# Patient Record
Sex: MALE | Race: BLACK OR AFRICAN AMERICAN | NOT HISPANIC OR LATINO | Employment: FULL TIME | URBAN - METROPOLITAN AREA
[De-identification: names, ages, dates, MRNs, and addresses within clinical notes are randomized per-mention and may not be internally consistent; named-entity substitution may affect disease eponyms.]

---

## 2020-03-04 ENCOUNTER — HOSPITAL ENCOUNTER (EMERGENCY)
Facility: OTHER | Age: 25
Discharge: HOME | End: 2020-03-04
Attending: EMERGENCY MEDICINE
Payer: MEDICAID

## 2020-03-04 VITALS
HEART RATE: 68 BPM | HEIGHT: 73 IN | RESPIRATION RATE: 16 BRPM | DIASTOLIC BLOOD PRESSURE: 58 MMHG | SYSTOLIC BLOOD PRESSURE: 103 MMHG | OXYGEN SATURATION: 96 % | WEIGHT: 180 LBS | BODY MASS INDEX: 23.86 KG/M2 | TEMPERATURE: 99 F

## 2020-03-04 DIAGNOSIS — J06.9 URI WITH COUGH AND CONGESTION: Primary | ICD-10-CM

## 2020-03-04 DIAGNOSIS — R05.9 COUGH: ICD-10-CM

## 2020-03-04 PROCEDURE — 99283 EMERGENCY DEPT VISIT LOW MDM: CPT | Mod: 25

## 2020-03-04 RX ORDER — MULTIVITAMIN
1 TABLET ORAL DAILY
COMMUNITY

## 2020-03-04 NOTE — ED TRIAGE NOTES
Pt presents to ed c/o chest pain that worsens with cough. The pt denies taking anything for the s/s, stating the chest pain is intermittent, denying any current chest pain . He denies any n/v/d fever or chills. Pt is awake and alert, resp pattern even and non labored.

## 2020-03-04 NOTE — ED PROVIDER NOTES
Encounter Date: 3/4/2020    SCRIBE #1 NOTE: Mason CLOUD, am scribing for, and in the presence of, Dr. Holcomb.       History     Chief Complaint   Patient presents with    URI     since last week with cough and congestion, has Hx of colapsed lung and states it felt like this lasttime     Time seen by provider: 1:53 AM    This is a 24 y.o. male who presents with complaint of an unproductive cough for the last 6 days. Pt states he has a hx of a collapsed lung and the current symptoms feel similar to previous. He reports congestion and chest pain. He denies fever.    The history is provided by the patient and medical records.     Review of patient's allergies indicates:  No Known Allergies  History reviewed. No pertinent past medical history.  History reviewed. No pertinent surgical history.  History reviewed. No pertinent family history.  Social History     Tobacco Use    Smoking status: Current Every Day Smoker   Substance Use Topics    Alcohol use: Yes     Comment: occasionally    Drug use: Yes     Types: Marijuana     Review of Systems   Constitutional: Negative for chills and fever.   HENT: Positive for congestion. Negative for rhinorrhea and sore throat.    Eyes: Negative for visual disturbance.   Respiratory: Positive for cough. Negative for shortness of breath.    Cardiovascular: Positive for chest pain.   Gastrointestinal: Negative for abdominal pain, diarrhea, nausea and vomiting.   Genitourinary: Negative for dysuria.   Musculoskeletal: Negative for back pain.   Skin: Negative for rash.   Neurological: Negative for dizziness, weakness and light-headedness.   Psychiatric/Behavioral: Negative for confusion.       Physical Exam     Initial Vitals [03/04/20 0137]   BP Pulse Resp Temp SpO2   131/89 89 18 99 °F (37.2 °C) 100 %      MAP       --         Physical Exam    Nursing note and vitals reviewed.  Constitutional: He appears well-developed and well-nourished. He is not diaphoretic. No distress.    HENT:   Head: Normocephalic and atraumatic.   Eyes: Conjunctivae and EOM are normal. Pupils are equal, round, and reactive to light. No scleral icterus.   Neck: Normal range of motion. Neck supple.   Cardiovascular: Normal rate, regular rhythm and normal heart sounds. Exam reveals no gallop and no friction rub.    No murmur heard.  Pulmonary/Chest: Breath sounds normal. No respiratory distress. He has no wheezes. He has no rhonchi. He has no rales.   Abdominal: Soft. Bowel sounds are normal. He exhibits no distension. There is no tenderness. There is no rebound and no guarding.   Musculoskeletal: Normal range of motion. He exhibits no edema or tenderness.   Neurological: He is alert and oriented to person, place, and time.   Skin: Skin is warm and dry.         ED Course   Procedures  Labs Reviewed - No data to display       Imaging Results          X-Ray Chest PA And Lateral (Final result)  Result time 03/04/20 02:18:33    Final result by Rodriguez Duncan MD (03/04/20 02:18:33)                 Impression:      No convincing radiographic evidence of acute intrathoracic process.      Electronically signed by: Rodriguez Duncan MD  Date:    03/04/2020  Time:    02:18             Narrative:    EXAMINATION:  XR CHEST PA AND LATERAL    CLINICAL HISTORY:  Cough    TECHNIQUE:  PA and lateral views of the chest were performed.    COMPARISON:  None    FINDINGS:  Cardiac monitoring leads overlie the chest.  The cardiomediastinal silhouette appears within normal limits.  The lungs are symmetrically aerated without evidence of focal airspace consolidation.  There is no evidence of large pleural effusion or pneumothorax.  Visualized osseous structures appear intact.                              X-Rays:   Independently Interpreted Readings:   Chest X-Ray: Normal heart size.  No infiltrates.  No acute abnormalities.     Medical Decision Making:   History:   Old Medical Records: I decided to obtain old medical records.  Initial  Assessment:   24-year-old male with history of a right-sided pneumothorax presents complaining cough for the past week with associated right-sided chest pain coughing and movement.  Patient states it feels similar to when he was diagnosed with a pneumothorax.  Patient is in no respiratory distress on exam with normal vital signs.  Independently Interpreted Test(s):   I have ordered and independently interpreted X-rays - see prior notes.  Clinical Tests:   Radiological Study: Reviewed and Ordered  ED Management:  X-ray obtained and negative for any acute process.  Upon reassessment the patient was sleeping comfortably in no distress.  Patient reassured by the negative chest x-ray.  He was given precautions for seeking immediate re-evaluation.            Scribe Attestation:   Scribe #1: I performed the above scribed service and the documentation accurately describes the services I performed. I attest to the accuracy of the note.    Attending Attestation:           Physician Attestation for Scribe:  Physician Attestation Statement for Scribe #1: I, Dr. Holcomb, reviewed documentation, as scribed by Mason Marroquin in my presence, and it is both accurate and complete.                               Clinical Impression:     1. URI with cough and congestion    2. Cough                ED Disposition Condition    Discharge Stable        ED Prescriptions     None        Follow-up Information     Follow up With Specialties Details Why Contact Info    Your primary care doctor                                         Ellie Holcomb MD  03/04/20 [id number]

## 2020-10-29 ENCOUNTER — HOSPITAL ENCOUNTER (EMERGENCY)
Facility: OTHER | Age: 25
Discharge: HOME | End: 2020-10-29
Attending: EMERGENCY MEDICINE
Payer: MEDICAID

## 2020-10-29 VITALS
OXYGEN SATURATION: 100 % | SYSTOLIC BLOOD PRESSURE: 98 MMHG | HEART RATE: 61 BPM | DIASTOLIC BLOOD PRESSURE: 59 MMHG | WEIGHT: 180 LBS | HEIGHT: 72 IN | TEMPERATURE: 98 F | BODY MASS INDEX: 24.38 KG/M2 | RESPIRATION RATE: 18 BRPM

## 2020-10-29 DIAGNOSIS — M62.830 MUSCLE SPASM OF BACK: Primary | ICD-10-CM

## 2020-10-29 PROCEDURE — 99284 EMERGENCY DEPT VISIT MOD MDM: CPT | Mod: 25

## 2020-10-29 PROCEDURE — 96372 THER/PROPH/DIAG INJ SC/IM: CPT

## 2020-10-29 PROCEDURE — 25000003 PHARM REV CODE 250: Performed by: EMERGENCY MEDICINE

## 2020-10-29 PROCEDURE — 63600175 PHARM REV CODE 636 W HCPCS: Performed by: EMERGENCY MEDICINE

## 2020-10-29 RX ORDER — METHOCARBAMOL 500 MG/1
1000 TABLET, FILM COATED ORAL 3 TIMES DAILY
Qty: 30 TABLET | Refills: 0 | Status: SHIPPED | OUTPATIENT
Start: 2020-10-29 | End: 2020-11-03

## 2020-10-29 RX ORDER — INDOMETHACIN 50 MG/1
50 CAPSULE ORAL
Qty: 15 CAPSULE | Refills: 0 | Status: SHIPPED | OUTPATIENT
Start: 2020-10-29 | End: 2020-11-01

## 2020-10-29 RX ORDER — DIAZEPAM 2 MG/1
2 TABLET ORAL
Status: COMPLETED | OUTPATIENT
Start: 2020-10-29 | End: 2020-10-29

## 2020-10-29 RX ORDER — KETOROLAC TROMETHAMINE 30 MG/ML
30 INJECTION, SOLUTION INTRAMUSCULAR; INTRAVENOUS
Status: COMPLETED | OUTPATIENT
Start: 2020-10-29 | End: 2020-10-29

## 2020-10-29 RX ADMIN — DIAZEPAM 2 MG: 2 TABLET ORAL at 04:10

## 2020-10-29 RX ADMIN — KETOROLAC TROMETHAMINE 30 MG: 30 INJECTION, SOLUTION INTRAMUSCULAR; INTRAVENOUS at 04:10

## 2020-10-29 NOTE — ED NOTES
Pt arrives to Ed with c/o back pain after fall at work. PT denies LOC or trauma to head. Pt reports taking muscle relaxer's and ibuprofen with minimal relief of symptoms. Lidocaine patch noted to upper back. Pt report minimal relief with symptoms after application of lidocaine patch. Pt placed on bp cycling and pulse ox.

## 2020-10-29 NOTE — ED PROVIDER NOTES
Encounter Date: 10/29/2020       History     Chief Complaint   Patient presents with    Back Pain     Pt reports fallign at work, denies LOC, pain unrelieved by medication     25-year-old male presents complaining of bilateral upper back/neck pain x2 days.  In addition he states he fell from standing after a slip and fall earlier in the evening.  He denies any head injury or loss of consciousness.  He denies any numbness or weakness.  Interventions at home included 1 dose of Flexeril as well as a medication patch.  Pain aggravated with neck movement.        Review of patient's allergies indicates:  No Known Allergies  No past medical history on file.  No past surgical history on file.  No family history on file.  Social History     Tobacco Use    Smoking status: Current Every Day Smoker   Substance Use Topics    Alcohol use: Yes     Comment: occasionally    Drug use: Yes     Types: Marijuana     Review of Systems   Constitutional: Negative for chills, diaphoresis and fever.   Respiratory: Negative for cough, shortness of breath, wheezing and stridor.    Cardiovascular: Negative for chest pain.   Gastrointestinal: Negative for abdominal pain, diarrhea, nausea and vomiting.   Musculoskeletal: Positive for back pain and neck pain. Negative for myalgias and neck stiffness.   Neurological: Negative for dizziness, facial asymmetry, weakness and light-headedness.       Physical Exam     Initial Vitals [10/29/20 0300]   BP Pulse Resp Temp SpO2   131/86 79 18 98.1 °F (36.7 °C) 100 %      MAP       --         Physical Exam    Vitals reviewed.  Constitutional: He appears well-developed and well-nourished.   HENT:   Head: Normocephalic and atraumatic.   Right Ear: External ear normal.   Left Ear: External ear normal.   Eyes: Conjunctivae and EOM are normal. Right eye exhibits no discharge. Left eye exhibits no discharge.   Cardiovascular: Normal rate, regular rhythm and normal heart sounds.   Pulmonary/Chest: Breath sounds  normal. No respiratory distress. He has no wheezes. He has no rhonchi. He has no rales.   Musculoskeletal: Normal range of motion.      Comments: Tenderness to palpation over the right trapezius and sternocleidomastoid muscles   Neurological: He is alert and oriented to person, place, and time. He has normal strength. GCS score is 15. GCS eye subscore is 4. GCS verbal subscore is 5. GCS motor subscore is 6.         ED Course   Procedures  Labs Reviewed - No data to display       Imaging Results    None          Medical Decision Making:   Initial Assessment:   25-year-old male presents complaining of bilateral upper neck/back pain.  On exam he has tenderness to palpation along the right sternocleidomastoid and trapezius as well as palpable spasm of the trapezius muscle.  ED Management:  Patient treated with Valium and Toradol in the emergency department discharged home with a prescription for Robaxin and indomethacin.  Was counseled supportive care for home and given indications for seeking re-evaluation emergency department.                             Clinical Impression:       ICD-10-CM ICD-9-CM   1. Muscle spasm of back  M62.830 724.8                          ED Disposition Condition    Discharge Stable        ED Prescriptions     Medication Sig Dispense Start Date End Date Auth. Provider    methocarbamoL (ROBAXIN) 500 MG Tab Take 2 tablets (1,000 mg total) by mouth 3 (three) times daily. for 5 days 30 tablet 10/29/2020 11/3/2020 Ellie Holcomb MD    indomethacin (INDOCIN) 50 MG capsule Take 1 capsule (50 mg total) by mouth 3 (three) times daily with meals. for 3 days 15 capsule 10/29/2020 11/1/2020 Ellie Holcomb MD        Follow-up Information    None                                      Ellie Holcomb MD  10/29/20 [id number]